# Patient Record
Sex: MALE | Race: WHITE | NOT HISPANIC OR LATINO | Employment: OTHER | URBAN - METROPOLITAN AREA
[De-identification: names, ages, dates, MRNs, and addresses within clinical notes are randomized per-mention and may not be internally consistent; named-entity substitution may affect disease eponyms.]

---

## 2019-01-02 ENCOUNTER — TELEPHONE (OUTPATIENT)
Dept: ORTHOPEDIC SURGERY | Facility: CLINIC | Age: 84
End: 2019-01-02

## 2019-01-02 ENCOUNTER — TELEPHONE (OUTPATIENT)
Dept: URGENT CARE | Facility: CLINIC | Age: 84
End: 2019-01-02

## 2019-01-02 NOTE — TELEPHONE ENCOUNTER
Yes that's fine. Please see if they can get xrays on disc, if not we will need to rexray. They are not in the system.

## 2019-01-07 ENCOUNTER — OFFICE VISIT (OUTPATIENT)
Dept: ORTHOPEDIC SURGERY | Facility: CLINIC | Age: 84
End: 2019-01-07

## 2019-01-07 VITALS — TEMPERATURE: 96.9 F | HEIGHT: 73 IN | BODY MASS INDEX: 28.1 KG/M2 | WEIGHT: 212 LBS

## 2019-01-07 DIAGNOSIS — S69.91XS WRIST INJURY, RIGHT, SEQUELA: Primary | ICD-10-CM

## 2019-01-07 PROCEDURE — 99213 OFFICE O/P EST LOW 20 MIN: CPT | Performed by: ORTHOPAEDIC SURGERY

## 2019-01-07 PROCEDURE — 73100 X-RAY EXAM OF WRIST: CPT | Performed by: ORTHOPAEDIC SURGERY

## 2019-01-07 NOTE — PROGRESS NOTES
New Right Wrist      Patient: Peter Nuñez        YOB: 1928        Chief Complaints: RIGHT WRIST PAIN      History of Present Illness: This is a 9-year-old right-hand-dominant male who fell on 1229 injuring his right wrist he was seen in urgent care and then felt to have a nondisplaced distal radius fracture.  He fell again yesterday with the splint on his states his wrist hurts a little bit more at no prior history of recent injury states 40 years ago he broke that wrist symptoms are mild intermittent aching with some swelling worse with range of motion better with anti-inflammatories and the brace he is retired his past medical history remarkable for hypertension hyperlipidemia    HPI      Allergies: No Known Allergies    Medications:   Home Medications:  Current Outpatient Medications on File Prior to Visit   Medication Sig   • benzonatate (TESSALON) 200 MG capsule Take 1 capsule by mouth 3 (Three) Times a Day As Needed for cough.   • benzonatate (TESSALON) 200 MG capsule Take 1 capsule by mouth 3 (Three) Times a Day As Needed for Cough.   • cephalexin (KEFLEX) 500 MG capsule Take 1 capsule by mouth 4 (Four) Times a Day.   • guaiFENesin-codeine (ROMILAR-AC) 100-10 MG/5ML syrup 2 tsp at bedtime for cough   • HYDROCHLOROTHIAZIDE PO Take  by mouth.   • lisinopril (PRINIVIL,ZESTRIL) 10 MG tablet Take 10 mg by mouth Daily.   • Multiple Vitamin (MULTIVITAMIN) tablet Take 1 tablet by mouth.   • mupirocin (BACTROBAN) 2 % ointment Apply to affected area with each dressing change.   • Simvastatin (ZOCOR PO) Take  by mouth.   • tamsulosin (FLOMAX) 0.4 MG capsule 24 hr capsule Take 0.4 mg by mouth.     No current facility-administered medications on file prior to visit.      Current Medications:  Scheduled Meds:  Continuous Infusions:  No current facility-administered medications for this visit.   PRN Meds:.    Past Medical History:   Diagnosis Date   • Hyperlipidemia    • Hypertension       No past surgical  history on file.     Social History     Occupational History   • Not on file   Tobacco Use   • Smoking status: Never Smoker   • Smokeless tobacco: Never Used   Substance and Sexual Activity   • Alcohol use: No   • Drug use: Not on file   • Sexual activity: Not on file    Social History     Social History Narrative   • Not on file      No family history on file.          Review of Systems: 14 point review of systems are remarkable for the pertinent positives listed in the chart by the patient the remainder are negative    Review of Systems      Physical Exam: 89 y.o. male  General Appearance:    Alert, cooperative, in no acute distress                 There were no vitals filed for this visit.   Patient is alert and read ×3 no acute distress appears her above-listed at height weight and age.  Affect is normal respiratory rate is normal unlabored. Heart rate regular rate rhythm, sclera, dentition and hearing are normal for the purpose of this exam.        Ortho Exam         Physical exam his right wrist he does have a little bit of swelling he has palpable tenderness over the distal radius no tenderness over snuffbox he has some thenar atrophy negative Tinel's normal form normal elbow exam  Radiology:   AP, Lateral of the right wrist were ordered/reviewed to evaluate pain. I have compared to previous films/  he does appear to have a nondisplaced distal radius fracture he has some degenerative changes in his carpal and basilar joint but nothing it's new there he does have a nondisplaced distal radius fracture        Assessment/Plan:    Distal radius fracture Displace He Can Continue the Splint That Is and I'll See Him Back 3-4 Weeks with Repeat X-Ray

## 2019-01-28 ENCOUNTER — OFFICE VISIT (OUTPATIENT)
Dept: ORTHOPEDIC SURGERY | Facility: CLINIC | Age: 84
End: 2019-01-28

## 2019-01-28 VITALS — TEMPERATURE: 98.4 F | HEIGHT: 73 IN | BODY MASS INDEX: 28.1 KG/M2 | WEIGHT: 212 LBS

## 2019-01-28 DIAGNOSIS — S69.91XD RIGHT WRIST INJURY, SUBSEQUENT ENCOUNTER: Primary | ICD-10-CM

## 2019-01-28 PROCEDURE — 73110 X-RAY EXAM OF WRIST: CPT | Performed by: ORTHOPAEDIC SURGERY

## 2019-01-28 PROCEDURE — 99212 OFFICE O/P EST SF 10 MIN: CPT | Performed by: ORTHOPAEDIC SURGERY

## 2019-01-28 RX ORDER — LISINOPRIL 20 MG/1
TABLET ORAL
COMMUNITY
Start: 2019-01-15

## 2019-01-28 RX ORDER — FELODIPINE 5 MG/1
TABLET, EXTENDED RELEASE ORAL
COMMUNITY
Start: 2019-01-15

## 2019-01-28 NOTE — PROGRESS NOTES
right  Wrist Fracture F/U        Patient: Peter Nuñez        YOB: 1928        Chief Complaints:right wrist pain  Chief Complaint   Patient presents with   • Right Wrist - Follow-up         History of Present Illness: Patient is here for follow-up of a right distal radius fracture he states he took his brace off today because it was itching him a little bit his pain is better still has some pain if he pushes up off his wrist but overall improved    HPI      Allergies: No Known Allergies    Medications:   Home Medications:  Current Outpatient Medications on File Prior to Visit   Medication Sig   • felodipine (PLENDIL) 5 MG 24 hr tablet    • HYDROCHLOROTHIAZIDE PO Take  by mouth.   • lisinopril (PRINIVIL,ZESTRIL) 20 MG tablet    • Multiple Vitamin (MULTIVITAMIN) tablet Take 1 tablet by mouth.   • mupirocin (BACTROBAN) 2 % ointment Apply to affected area with each dressing change.   • Simvastatin (ZOCOR PO) Take  by mouth.   • tamsulosin (FLOMAX) 0.4 MG capsule 24 hr capsule Take 0.4 mg by mouth.   • [DISCONTINUED] benzonatate (TESSALON) 200 MG capsule Take 1 capsule by mouth 3 (Three) Times a Day As Needed for cough.   • [DISCONTINUED] benzonatate (TESSALON) 200 MG capsule Take 1 capsule by mouth 3 (Three) Times a Day As Needed for Cough.   • [DISCONTINUED] cephalexin (KEFLEX) 500 MG capsule Take 1 capsule by mouth 4 (Four) Times a Day.   • [DISCONTINUED] guaiFENesin-codeine (ROMILAR-AC) 100-10 MG/5ML syrup 2 tsp at bedtime for cough   • [DISCONTINUED] lisinopril (PRINIVIL,ZESTRIL) 10 MG tablet Take 10 mg by mouth Daily.     No current facility-administered medications on file prior to visit.      Current Medications:  Scheduled Meds:  Continuous Infusions:  No current facility-administered medications for this visit.        PRN Meds:.    Past Medical History:   Diagnosis Date   • Hyperlipidemia    • Hypertension       No  "past surgical history on file.     Social History     Occupational History   • Not on file   Tobacco Use   • Smoking status: Never Smoker   • Smokeless tobacco: Never Used   Substance and Sexual Activity   • Alcohol use: No   • Drug use: Not on file   • Sexual activity: Not on file    Social History     Social History Narrative   • Not on file      No family history on file.          Physical Exam: 89 y.o. male  General Appearance:    Alert, cooperative, in no acute distress                 Vitals:    01/28/19 1445   Temp: 98.4 °F (36.9 °C)   Weight: 96.2 kg (212 lb)   Height: 185.4 cm (73\")      Patient is alert and read ×3 no acute distress appears her above-listed at height weight and age.  Affect is normal respiratory rate is normal unlabored. Heart rate regular rate rhythm, sclera, dentition and hearing are normal for the purpose of this exam.              Physical Exam:  S physical exam of the right wrist little bit of swelling this is mild is much improved some palpitations and near the distal radius but overall improved as well range of motion is quite good forearm and elbow are normal     radiology:   AP, Lateral  And oblique of the right wrist  ordered/reviewed to evaluate pain. I have compared to previous films       he has a healing distal radius fracture that is in great position                                Assessment/Plan:      Follow-up distal radius fracture still want him wearing his brace for the next couple of weeks and he can wean out of that.  I want him to make an appointment for a month he is completely asymptomatic in a month he may call and cancel                                   "

## 2019-05-24 ENCOUNTER — TELEPHONE (OUTPATIENT)
Dept: URGENT CARE | Facility: CLINIC | Age: 84
End: 2019-05-24

## 2019-05-24 NOTE — TELEPHONE ENCOUNTER
----- Message from Chacorta Chery MD sent at 5/24/2019  4:31 PM EDT -----  Urine culture was positive. Needs to finish the antibiotic. If still having symptoms, needs to recheck with PMD.

## 2022-04-18 ENCOUNTER — TELEPHONE (OUTPATIENT)
Dept: ORTHOPEDIC SURGERY | Facility: CLINIC | Age: 87
End: 2022-04-18

## 2022-04-18 ENCOUNTER — DOCUMENTATION (OUTPATIENT)
Dept: ORTHOPEDIC SURGERY | Facility: CLINIC | Age: 87
End: 2022-04-18

## 2022-04-18 DIAGNOSIS — M54.50 CHRONIC MIDLINE LOW BACK PAIN WITHOUT SCIATICA: Primary | ICD-10-CM

## 2022-04-18 DIAGNOSIS — G89.29 CHRONIC MIDLINE LOW BACK PAIN WITHOUT SCIATICA: Primary | ICD-10-CM

## 2022-04-18 RX ORDER — ACETAMINOPHEN AND CODEINE PHOSPHATE 300; 30 MG/1; MG/1
1 TABLET ORAL EVERY 6 HOURS PRN
Qty: 45 TABLET | Refills: 0 | Status: SHIPPED | OUTPATIENT
Start: 2022-04-18

## 2022-04-18 NOTE — PROGRESS NOTES
Spoke to patient he still having low back pain that does not radiate he is neurologically intact no bowel or bladder symptoms he had an appointment with Dr. Singh and had to cancel it because his wife was sick.  His wife passed away she was a long-term patient of mine he is doing a lot with family at this time.  I asked if I could work him in tomorrow he might be going out of town if not I will see him tomorrow if he goes out of town I will see him as soon as he gets back we will call him in a few Tylenol 3 because I know this patient well and he and I have talked about his back in the past we did talk about risk of the medication

## 2024-08-13 ENCOUNTER — APPOINTMENT (OUTPATIENT)
Dept: CT IMAGING | Facility: HOSPITAL | Age: 89
End: 2024-08-13
Payer: MEDICARE

## 2024-08-13 ENCOUNTER — HOSPITAL ENCOUNTER (EMERGENCY)
Facility: HOSPITAL | Age: 89
Discharge: HOME | End: 2024-08-13
Attending: STUDENT IN AN ORGANIZED HEALTH CARE EDUCATION/TRAINING PROGRAM
Payer: MEDICARE

## 2024-08-13 ENCOUNTER — APPOINTMENT (OUTPATIENT)
Dept: CARDIOLOGY | Facility: HOSPITAL | Age: 89
End: 2024-08-13
Payer: MEDICARE

## 2024-08-13 VITALS
WEIGHT: 200 LBS | HEART RATE: 89 BPM | OXYGEN SATURATION: 97 % | DIASTOLIC BLOOD PRESSURE: 73 MMHG | RESPIRATION RATE: 16 BRPM | SYSTOLIC BLOOD PRESSURE: 112 MMHG | HEIGHT: 73 IN | TEMPERATURE: 97.9 F | BODY MASS INDEX: 26.51 KG/M2

## 2024-08-13 DIAGNOSIS — R53.1 GENERALIZED WEAKNESS: ICD-10-CM

## 2024-08-13 DIAGNOSIS — R55 NEAR SYNCOPE: Primary | ICD-10-CM

## 2024-08-13 LAB
ALBUMIN SERPL-MCNC: 3.8 G/DL (ref 3.5–5.2)
ALBUMIN/GLOB SERPL: 1.6 G/DL
ALP SERPL-CCNC: 65 U/L (ref 39–117)
ALT SERPL W P-5'-P-CCNC: 17 U/L (ref 1–41)
ANION GAP SERPL CALCULATED.3IONS-SCNC: 7 MMOL/L (ref 5–15)
AST SERPL-CCNC: 18 U/L (ref 1–40)
BASOPHILS # BLD AUTO: 0.03 10*3/MM3 (ref 0–0.2)
BASOPHILS NFR BLD AUTO: 0.5 % (ref 0–1.5)
BILIRUB SERPL-MCNC: 0.3 MG/DL (ref 0–1.2)
BUN SERPL-MCNC: 15 MG/DL (ref 8–23)
BUN/CREAT SERPL: 20 (ref 7–25)
CALCIUM SPEC-SCNC: 9.2 MG/DL (ref 8.2–9.6)
CHLORIDE SERPL-SCNC: 100 MMOL/L (ref 98–107)
CO2 SERPL-SCNC: 31 MMOL/L (ref 22–29)
CREAT SERPL-MCNC: 0.75 MG/DL (ref 0.76–1.27)
DEPRECATED RDW RBC AUTO: 44 FL (ref 37–54)
EGFRCR SERPLBLD CKD-EPI 2021: 82.6 ML/MIN/1.73
EOSINOPHIL # BLD AUTO: 0.04 10*3/MM3 (ref 0–0.4)
EOSINOPHIL NFR BLD AUTO: 0.6 % (ref 0.3–6.2)
ERYTHROCYTE [DISTWIDTH] IN BLOOD BY AUTOMATED COUNT: 12.1 % (ref 12.3–15.4)
GLOBULIN UR ELPH-MCNC: 2.4 GM/DL
GLUCOSE SERPL-MCNC: 166 MG/DL (ref 65–99)
HCT VFR BLD AUTO: 38.3 % (ref 37.5–51)
HGB BLD-MCNC: 12.5 G/DL (ref 13–17.7)
IMM GRANULOCYTES # BLD AUTO: 0.01 10*3/MM3 (ref 0–0.05)
IMM GRANULOCYTES NFR BLD AUTO: 0.2 % (ref 0–0.5)
LYMPHOCYTES # BLD AUTO: 1.2 10*3/MM3 (ref 0.7–3.1)
LYMPHOCYTES NFR BLD AUTO: 19.3 % (ref 19.6–45.3)
MCH RBC QN AUTO: 32.2 PG (ref 26.6–33)
MCHC RBC AUTO-ENTMCNC: 32.6 G/DL (ref 31.5–35.7)
MCV RBC AUTO: 98.7 FL (ref 79–97)
MONOCYTES # BLD AUTO: 0.36 10*3/MM3 (ref 0.1–0.9)
MONOCYTES NFR BLD AUTO: 5.8 % (ref 5–12)
NEUTROPHILS NFR BLD AUTO: 4.58 10*3/MM3 (ref 1.7–7)
NEUTROPHILS NFR BLD AUTO: 73.6 % (ref 42.7–76)
NRBC BLD AUTO-RTO: 0 /100 WBC (ref 0–0.2)
NT-PROBNP SERPL-MCNC: 402 PG/ML (ref 0–1800)
PLATELET # BLD AUTO: 228 10*3/MM3 (ref 140–450)
PMV BLD AUTO: 9.2 FL (ref 6–12)
POTASSIUM SERPL-SCNC: 4.3 MMOL/L (ref 3.5–5.2)
PROT SERPL-MCNC: 6.2 G/DL (ref 6–8.5)
QT INTERVAL: 417 MS
QTC INTERVAL: 487 MS
RBC # BLD AUTO: 3.88 10*6/MM3 (ref 4.14–5.8)
SODIUM SERPL-SCNC: 138 MMOL/L (ref 136–145)
TROPONIN T SERPL HS-MCNC: 34 NG/L
TROPONIN T SERPL HS-MCNC: 36 NG/L
WBC NRBC COR # BLD AUTO: 6.22 10*3/MM3 (ref 3.4–10.8)

## 2024-08-13 PROCEDURE — 93246 EXT ECG>7D<15D RECORDING: CPT

## 2024-08-13 PROCEDURE — 85025 COMPLETE CBC W/AUTO DIFF WBC: CPT | Performed by: STUDENT IN AN ORGANIZED HEALTH CARE EDUCATION/TRAINING PROGRAM

## 2024-08-13 PROCEDURE — 83880 ASSAY OF NATRIURETIC PEPTIDE: CPT | Performed by: STUDENT IN AN ORGANIZED HEALTH CARE EDUCATION/TRAINING PROGRAM

## 2024-08-13 PROCEDURE — 36415 COLL VENOUS BLD VENIPUNCTURE: CPT

## 2024-08-13 PROCEDURE — 84484 ASSAY OF TROPONIN QUANT: CPT | Performed by: STUDENT IN AN ORGANIZED HEALTH CARE EDUCATION/TRAINING PROGRAM

## 2024-08-13 PROCEDURE — 70450 CT HEAD/BRAIN W/O DYE: CPT

## 2024-08-13 PROCEDURE — 93005 ELECTROCARDIOGRAM TRACING: CPT | Performed by: STUDENT IN AN ORGANIZED HEALTH CARE EDUCATION/TRAINING PROGRAM

## 2024-08-13 PROCEDURE — 93010 ELECTROCARDIOGRAM REPORT: CPT | Performed by: INTERNAL MEDICINE

## 2024-08-13 PROCEDURE — 80053 COMPREHEN METABOLIC PANEL: CPT | Performed by: STUDENT IN AN ORGANIZED HEALTH CARE EDUCATION/TRAINING PROGRAM

## 2024-08-13 PROCEDURE — 99284 EMERGENCY DEPT VISIT MOD MDM: CPT

## 2024-08-13 NOTE — ED NOTES
Pt to ED from home via [facility]. EMS called by family, pt had called family via [facility] requesting help. Pt family called 911. EMS reports pt was sitting in recliner on arrival. Per family pt is more lethargic. Pt a&ox4, pt uses wheelchair at home. Pt complains of weakness. Denies pain or injury. Pt baseline room air, ems reported pt dropped to 88% on room air and was placed on 2L NC.

## 2024-08-13 NOTE — ED PROVIDER NOTES
EMERGENCY DEPARTMENT ENCOUNTER    Room Number:  31/31  PCP: Jeffrey Headley MD  History obtained from: Patient      HPI:  Chief Complaint: Generalized weakness  A complete HPI/ROS/PMH/PSH/SH/FH are unobtainable due to: N/A  Context: Peter Nuñez is a 89 y.o. male who presents to the ED c/o generalized weakness.  Has been having episodes of weakness where he also has some difficulty speaking.  These tend to occur in the morning.  Not associated with position changes.  No other recent fever, chills.  Typically last for half an hour or so.  Patient feels better now.  No chest pain or shortness of breath.  No numbness or weakness currently.            PAST MEDICAL HISTORY  Active Ambulatory Problems     Diagnosis Date Noted    Wrist injury, right, sequela 01/07/2019     Resolved Ambulatory Problems     Diagnosis Date Noted    No Resolved Ambulatory Problems     Past Medical History:   Diagnosis Date    Hyperlipidemia     Hypertension          PAST SURGICAL HISTORY  Past Surgical History:   Procedure Laterality Date    JOINT REPLACEMENT           FAMILY HISTORY  No family history on file.      SOCIAL HISTORY  Social History     Socioeconomic History    Marital status:    Tobacco Use    Smoking status: Never    Smokeless tobacco: Never   Substance and Sexual Activity    Alcohol use: No    Drug use: Defer    Sexual activity: Defer         ALLERGIES  Patient has no known allergies.        REVIEW OF SYSTEMS    As per HPI      PHYSICAL EXAM  ED Triage Vitals   Temp Heart Rate Resp BP SpO2   08/13/24 0853 08/13/24 0853 08/13/24 0853 08/13/24 0853 08/13/24 0853   97.9 °F (36.6 °C) 85 18 140/78 98 %      Temp src Heart Rate Source Patient Position BP Location FiO2 (%)   -- 08/13/24 0914 08/13/24 0914 08/13/24 0914 --    Monitor Lying Right arm        Physical Exam      Vital signs and nursing notes reviewed.          LAB RESULTS  Recent Results (from the past 24 hour(s))   Comprehensive Metabolic Panel     Collection Time: 08/13/24  9:14 AM    Specimen: Blood   Result Value Ref Range    Glucose 166 (H) 65 - 99 mg/dL    BUN 15 8 - 23 mg/dL    Creatinine 0.75 (L) 0.76 - 1.27 mg/dL    Sodium 138 136 - 145 mmol/L    Potassium 4.3 3.5 - 5.2 mmol/L    Chloride 100 98 - 107 mmol/L    CO2 31.0 (H) 22.0 - 29.0 mmol/L    Calcium 9.2 8.2 - 9.6 mg/dL    Total Protein 6.2 6.0 - 8.5 g/dL    Albumin 3.8 3.5 - 5.2 g/dL    ALT (SGPT) 17 1 - 41 U/L    AST (SGOT) 18 1 - 40 U/L    Alkaline Phosphatase 65 39 - 117 U/L    Total Bilirubin 0.3 0.0 - 1.2 mg/dL    Globulin 2.4 gm/dL    A/G Ratio 1.6 g/dL    BUN/Creatinine Ratio 20.0 7.0 - 25.0    Anion Gap 7.0 5.0 - 15.0 mmol/L    eGFR 82.6 >60.0 mL/min/1.73   BNP    Collection Time: 08/13/24  9:14 AM    Specimen: Blood   Result Value Ref Range    proBNP 402.0 0.0 - 1,800.0 pg/mL   Single High Sensitivity Troponin T    Collection Time: 08/13/24  9:14 AM    Specimen: Blood   Result Value Ref Range    HS Troponin T 34 (H) <22 ng/L   CBC Auto Differential    Collection Time: 08/13/24  9:14 AM    Specimen: Blood   Result Value Ref Range    WBC 6.22 3.40 - 10.80 10*3/mm3    RBC 3.88 (L) 4.14 - 5.80 10*6/mm3    Hemoglobin 12.5 (L) 13.0 - 17.7 g/dL    Hematocrit 38.3 37.5 - 51.0 %    MCV 98.7 (H) 79.0 - 97.0 fL    MCH 32.2 26.6 - 33.0 pg    MCHC 32.6 31.5 - 35.7 g/dL    RDW 12.1 (L) 12.3 - 15.4 %    RDW-SD 44.0 37.0 - 54.0 fl    MPV 9.2 6.0 - 12.0 fL    Platelets 228 140 - 450 10*3/mm3    Neutrophil % 73.6 42.7 - 76.0 %    Lymphocyte % 19.3 (L) 19.6 - 45.3 %    Monocyte % 5.8 5.0 - 12.0 %    Eosinophil % 0.6 0.3 - 6.2 %    Basophil % 0.5 0.0 - 1.5 %    Immature Grans % 0.2 0.0 - 0.5 %    Neutrophils, Absolute 4.58 1.70 - 7.00 10*3/mm3    Lymphocytes, Absolute 1.20 0.70 - 3.10 10*3/mm3    Monocytes, Absolute 0.36 0.10 - 0.90 10*3/mm3    Eosinophils, Absolute 0.04 0.00 - 0.40 10*3/mm3    Basophils, Absolute 0.03 0.00 - 0.20 10*3/mm3    Immature Grans, Absolute 0.01 0.00 - 0.05 10*3/mm3    nRBC 0.0 0.0  - 0.2 /100 WBC   ECG 12 Lead Altered Mental Status    Collection Time: 08/13/24  9:30 AM   Result Value Ref Range    QT Interval 417 ms    QTC Interval 487 ms   Single High Sensitivity Troponin T    Collection Time: 08/13/24  1:11 PM    Specimen: Blood   Result Value Ref Range    HS Troponin T 36 (H) <22 ng/L       Ordered the above labs and reviewed the results.        RADIOLOGY  CT Head Without Contrast    Result Date: 8/13/2024  CT OF THE BRAIN WITHOUT CONTRAST 8/13/2024  HISTORY: Weakness. Abnormal speech.  Axial images were obtained through the brain without intravenous contrast.  There is moderately severe diffuse atrophy. There is decreased attenuation of the periventricular white matter bilaterally consistent with small vessel white matter ischemic disease. 1 or 2 small old lacunar infarctions are seen in the right basal ganglia. There is no evidence of acute infarction, hemorrhage, midline shift or mass effect.  No bony abnormalities are seen.      1. Atrophy and extensive small vessel ischemic disease. 2. No acute intracranial process identified. If further evaluation is clinically indicated follow-up MRI of the brain may be helpful.  Radiation dose reduction techniques were utilized, including automated exposure control and exposure modulation based on body size.   This report was finalized on 8/13/2024 12:16 PM by Dr. Jeff Byers M.D on Workstation: [id number]       Ordered the above noted radiological studies. Reviewed by me in PACS.              MEDICATIONS GIVEN IN ER  Medications - No data to display            MEDICAL DECISION MAKING, PROGRESS, and CONSULTS    MDM: Patient presented emergency department with transient weakness, otherwise well-appearing, vitals otherwise stable.  Intermittent for some time.  Labs and imaging reassuring in the ER.  Appears more global than focal, low suspicion for TIA.  Will place event monitor and discharge patient with close outpatient follow-up.  Given  return precautions and discharged home.    All labs have been independently reviewed by me.  All radiology studies have been reviewed by me and I have also reviewed the radiology report.   EKG's independently viewed and interpreted by me.  Discussion below represents my analysis of pertinent findings related to patient's condition, differential diagnosis, treatment plan and final disposition.      Additional sources:  - Discussed/ obtained information from independent historians:      - External (non-ED) record review:     - Chronic or social conditions impacting care: Age    - Shared decision making:        Orders placed during this visit:  Orders Placed This Encounter   Procedures    CT Head Without Contrast    Comprehensive Metabolic Panel    BNP    Single High Sensitivity Troponin T    CBC Auto Differential    Single High Sensitivity Troponin T    Inpatient Neurology Consult General    Holter Monitor - 72 Hour Up To 15 Days    ECG 12 Lead Altered Mental Status    CBC & Differential         Additional orders considered but not ordered:  Considered CT angiograms however patient has no focal findings.        Differential diagnosis includes but is not limited to:    Hypotension, hypoglycemia, medication side effect, cardiac conduction abnormality, intermittent complete heart block      Independent interpretation of labs, radiology studies, and discussions with consultants:  ED Course as of 08/13/24 1407   Tue Aug 13, 2024   1036 CT head interpreted myself:  No large hemorrhage or midline shift, extensive white matter disease [FS]   1306 EKG interpreted myself:  930, sinus rhythm at rate of 82, bifascicular block, no inappropriate repolarization abnormality. first-degree AV block. [FS]   1343 HS Troponin T(!): 36 [FS]      ED Course User Index  [FS] Spenser Ramírez MD           DIAGNOSIS  Final diagnoses:   Near syncope   Generalized weakness         DISPOSITION  Discharged home        Latest Documented Vital  Signs:  As of 14:07 EDT  BP- 112/73 HR- 89 Temp- 97.9 °F (36.6 °C) O2 sat- 97%              --    Please note that portions of this were completed with a voice recognition program.       Note Disclaimer: At [facility], we believe that sharing information builds trust and better relationships. You are receiving this note because you are receiving care at [facility] or recently visited. It is possible you will see health information before a provider has talked with you about it. This kind of information can be easy to misunderstand. To help you fully understand what it means for your health, we urge you to discuss this note with your provider.             Spenser Ramírez MD  08/13/24 8411

## 2025-01-30 ENCOUNTER — APPOINTMENT (OUTPATIENT)
Dept: CT IMAGING | Facility: HOSPITAL | Age: OVER 89
End: 2025-01-30
Payer: MEDICARE

## 2025-01-30 ENCOUNTER — HOSPITAL ENCOUNTER (EMERGENCY)
Facility: HOSPITAL | Age: OVER 89
Discharge: HOME | End: 2025-01-30
Attending: STUDENT IN AN ORGANIZED HEALTH CARE EDUCATION/TRAINING PROGRAM
Payer: MEDICARE

## 2025-01-30 VITALS
WEIGHT: 199.96 LBS | HEIGHT: 73 IN | OXYGEN SATURATION: 92 % | SYSTOLIC BLOOD PRESSURE: 133 MMHG | TEMPERATURE: 97.8 F | BODY MASS INDEX: 26.5 KG/M2 | HEART RATE: 83 BPM | RESPIRATION RATE: 18 BRPM | DIASTOLIC BLOOD PRESSURE: 63 MMHG
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
## 2025-01-30 DIAGNOSIS — S09.90XA INJURY OF HEAD, INITIAL ENCOUNTER: ICD-10-CM

## 2025-01-30 PROCEDURE — 72125 CT NECK SPINE W/O DYE: CPT

## 2025-01-30 PROCEDURE — 70450 CT HEAD/BRAIN W/O DYE: CPT

## 2025-01-30 PROCEDURE — 99284 EMERGENCY DEPT VISIT MOD MDM: CPT

## 2025-01-30 NOTE — ED NOTES
Pt to ed from home via EMS    Pt had fall out of chair. Pt hit head, no LOC, no thinners. Pt c/o HA, neck pain, and dizziness.

## 2025-01-30 NOTE — ED PROVIDER NOTES
EMERGENCY DEPARTMENT ENCOUNTER  Room Number:  23/23  PCP: Jeffrey Headley MD  Independent Historians: Patient and Family      HPI:  Chief Complaint: had concerns including Fall.   Context: Peter Nuñez is a 89 y.o. male with a medical history of HLD, HTN who presents to the ED c/o acute injury sustained in a mechanical fall.  Patient was reaching for something while in his wheelchair earlier today and slipped out of the wheelchair hitting his head.  Patient had no loss of consciousness.  Patient is not on any blood thinners.  Patient states is the second time in 4 days this has happened.  Patient is complaining of pain in mid cervical spine and mild headache.      Review of prior external notes (non-ED) -and- Review of prior external test results outside of this encounter: Discharge summary from 6/29/2022 reviewed and notable for admission secondary to back pain after mechanical fall.  Patient found to have lumbar compression fracture and was admitted for pain management and PT evaluation patient was put into LSO brace for ambulation and discharged follow-up with neurosurgery in 6 weeks.    Prescription drug monitoring program review:         PAST MEDICAL HISTORY  Active Ambulatory Problems     Diagnosis Date Noted    Wrist injury, right, sequela 01/07/2019     Resolved Ambulatory Problems     Diagnosis Date Noted    No Resolved Ambulatory Problems     Past Medical History:   Diagnosis Date    Hyperlipidemia     Hypertension          PAST SURGICAL HISTORY  Past Surgical History:   Procedure Laterality Date    JOINT REPLACEMENT           FAMILY HISTORY  No family history on file.      SOCIAL HISTORY  Social History     Socioeconomic History    Marital status:    Tobacco Use    Smoking status: Never    Smokeless tobacco: Never   Substance and Sexual Activity    Alcohol use: No    Drug use: Defer    Sexual activity: Defer         ALLERGIES  Patient has no known allergies.      REVIEW OF SYSTEMS  Review  of Systems  Included in HPI  All systems reviewed and negative except for those discussed in HPI.      PHYSICAL EXAM    I have reviewed the triage vital signs and nursing notes.    ED Triage Vitals [01/30/25 1355]   Temp Heart Rate Resp BP SpO2   97.8 °F (36.6 °C) 78 18 141/70 97 %      Temp src Heart Rate Source Patient Position BP Location FiO2 (%)   Tympanic Monitor -- -- --       Physical Exam  GENERAL: alert, no acute distress  SKIN: Warm, dry  HENT: Normocephalic, atraumatic  EYES: no scleral icterus  CV: regular rhythm, regular rate  RESPIRATORY: normal effort, lungs clear  ABDOMEN: soft, nontender, nondistended  MUSCULOSKELETAL: no deformity.  Mild tenderness palpation in the paraspinal muscles of the cervical spine without midline tenderness  NEURO: alert, moves all extremities, follows commands            LAB RESULTS  No results found for this or any previous visit (from the past 24 hours).      RADIOLOGY  CT Head Without Contrast, CT Cervical Spine Without Contrast    Result Date: 1/30/2025  HISTORY: Fell, head injury, neck pain.  CT OF THE BRAIN WITHOUT CONTRAST  TECHNIQUE: Axial images were obtained through the brain without intravenous contrast.  FINDINGS: There is moderately severe diffuse atrophy. There is decreased attenuation of the periventricular white matter bilaterally consistent with small vessel white matter ischemic disease. There is no evidence of acute infarction, hemorrhage, midline shift or mass effect.  No bony abnormalities are seen.      1. No acute process identified.  CT OF THE CERVICAL SPINE WITHOUT CONTRAST  TECHNIQUE: Spiral images were obtained from the skull base to the upper thoracic spine. Sagittal and coronal reconstruction images were reviewed.  FINDINGS: There is some motion artifact at the C1-C2 articulation. This is best seen on axial images 39 through 46. There is degenerative disease of the C1-C2 articulation. Small disc protrusion is seen at C3-4. There is  anterolisthesis of C4 on C5 with some uncovering of the intervertebral disc. Minimal anterolisthesis of C5 on C6 is seen with small disc protrusion at this level. Anterior osteophytosis is seen at C3-4, C4-5, C5-6, C6-7 and C7-T1. No other subluxation is seen. There is multilevel facet joint arthropathy.  Bilateral carotid calcification is seen.  IMPRESSION: 1. Multilevel cervical degenerative disease. 2. No cervical spine fractures are seen.    Radiation dose reduction techniques were utilized, including automated exposure control and exposure modulation based on body size.          MEDICATIONS GIVEN IN ER  Medications - No data to display      ORDERS PLACED DURING THIS VISIT:  Orders Placed This Encounter   Procedures    CT Head Without Contrast    CT Cervical Spine Without Contrast         OUTPATIENT MEDICATION MANAGEMENT:  No current Epic-ordered facility-administered medications on file.     Current Outpatient Medications Ordered in Epic   Medication Sig Dispense Refill    acetaminophen-codeine (TYLENOL #3) 300-30 MG per tablet Take 1 tablet by mouth Every 6 (Six) Hours As Needed for Moderate Pain . 45 tablet 0    alendronate (FOSAMAX) 70 MG tablet       amoxicillin-clavulanate (AUGMENTIN) 875-125 MG per tablet Take 1 tablet by mouth 2 (Two) Times a Day. 14 tablet 0    felodipine (PLENDIL) 5 MG 24 hr tablet       lisinopril (PRINIVIL,ZESTRIL) 20 MG tablet       Multiple Vitamin (MULTIVITAMIN) tablet Take 1 tablet by mouth.      phenazopyridine (PYRIDIUM) 200 MG tablet Take 1 tablet by mouth 3 (Three) Times a Day As Needed for Bladder Spasms. 10 tablet 0    Simvastatin (ZOCOR PO) Take  by mouth.      sulfamethoxazole-trimethoprim (BACTRIM DS,SEPTRA DS) 800-160 MG per tablet Take 1 tablet by mouth 2 (Two) Times a Day. 14 tablet 0    tamsulosin (FLOMAX) 0.4 MG capsule 24 hr capsule Take 0.4 mg by mouth.           PROCEDURES  Procedures            PROGRESS, DATA ANALYSIS, CONSULTS, AND MEDICAL DECISION MAKING  All  labs have been independently interpreted by me.  All radiology studies have been reviewed by me. All EKG's have been independently viewed and interpreted by me.  Discussion below represents my analysis of pertinent findings related to patient's condition, differential diagnosis, treatment plan and final disposition.    Differential diagnosis includes but is not limited to contusion, intracranial hemorrhage, cervical spine strain, fracture.    Clinical Scores:                                       ED Course as of 01/30/25 1713   Thu Jan 30, 2025   1550 CT head interpreted by me and demonstrates no evidence of intracranial hemorrhage [MW]   1712 Radiological evaluations overall unremarkable.  Vital signs within normal limits.  Fall is mechanical in nature.  Son is working on fixing wheelchair to be safer for patient.  Will discharge follow-up on outpatient basis.  Son and patient are agreeable. [MW]      ED Course User Index  [MW] Tim Gibbs MD             AS OF 17:13 EST VITALS:    BP - 133/63  HR - 87  TEMP - 97.8 °F (36.6 °C) (Tympanic)  O2 SATS - 94%    COMPLEXITY OF CARE  Admission was considered but after careful review of the patient's presentation, physical examination, diagnostic results, and response to treatment the patient may be safely discharged with outpatient follow-up.      DIAGNOSIS  Final diagnoses:   Fall, initial encounter   Injury of head, initial encounter         DISPOSITION  ED Disposition       ED Disposition   Discharge    Condition   Stable    Comment   --                Please note that portions of this document were completed with a voice recognition program.    Note Disclaimer: At [facility], we believe that sharing information builds trust and better relationships. You are receiving this note because you recently visited [facility]. It is possible you will see health information before a provider has talked with you about it. This kind of information can be easy to  misunderstand. To help you fully understand what it means for your health, we urge you to discuss this note with your provider.         Tim Gibbs MD  01/30/25 0412

## 2025-01-30 NOTE — DISCHARGE INSTRUCTIONS
Please follow-up with your primary care physician within 1 week for repeat evaluation    Please return to the ER with new or worsening symptoms including but not limited to recurrent falls, confusion, dizziness, lightheadedness, changes in mental status, chest pain, shortness of breath